# Patient Record
(demographics unavailable — no encounter records)

---

## 2025-06-13 NOTE — ASSESSMENT
[FreeTextEntry1] : Presenting complaints:   This is a 15-year-old girl referred for cardiac evaluation due to easy fatigability. She does boxing and wrestling which are intense 2-hour classes in evening. She feels tired and exhausted sometimes with presyncope symptoms as compared to her peers. She does not complain of palpitations, chest pain or dizziness at that time. She has depression and is on Prozac for last few weeks.  Asymptomatic from cardiac standpoint. Good exercise tolerance.   PMH:	Negative FH: 	H/O possible ASD repair in maternal uncle at age 45 years ROS:	  General appearance: Healthy female		 HEENT: Unremarkable		 Lungs: Clear		 Abdomen: Soft	 Extremities: Unremarkable		 Neuro assessment: Within normal limits		  Physical Examination:   General examination:   /72 mmHg   | Pulse 72/min   | Ht  65 inches | Wt 115 lbs   | BMI 19.5 kg/m2 	 Systemic examination: Perfusion:  Good		Pulses:		+ / +  Cardiac Exam:		Precordium quiet S1 normal   S2 normal   S3/S4 negative Heart Murmurs: No significant heart murmur appreciated Click/rub:  Negative Findings of CHF:  Negative  Lungs:	Clear Abdomen: Soft, BS +, No organomegaly Liver:	GRAHAM  Remaining systemic exam is with in normal limits.  EKG:  Normal tracing  Impression:	 15-year-old girl/ Easy fatigability / Normal cardiac exam/ Normal EKG.  Work up: 2D Echo-Doppler study: Normal study  Diagnosis: 15-year-old girl / Normal cardiac status.  Plan: Anticipatory guidance for adequate hydration, diet modification and incremental aerobic exercise to improve stamina.  Follow-up after 1 year for EKG monitoring in context of use of anti-depressant medication.  KELECHI OROURKE M.D., F.A.C.C  I have spent 20 minutes for evaluation and discuss findings of workup and management.